# Patient Record
Sex: MALE | Race: BLACK OR AFRICAN AMERICAN | NOT HISPANIC OR LATINO | URBAN - METROPOLITAN AREA
[De-identification: names, ages, dates, MRNs, and addresses within clinical notes are randomized per-mention and may not be internally consistent; named-entity substitution may affect disease eponyms.]

---

## 2017-06-14 ENCOUNTER — INPATIENT (INPATIENT)
Facility: HOSPITAL | Age: 67
LOS: 0 days | Discharge: HOME | DRG: 670 | End: 2017-06-15
Attending: UROLOGY | Admitting: UROLOGY
Payer: COMMERCIAL

## 2017-06-14 VITALS
HEART RATE: 77 BPM | RESPIRATION RATE: 18 BRPM | OXYGEN SATURATION: 99 % | DIASTOLIC BLOOD PRESSURE: 63 MMHG | SYSTOLIC BLOOD PRESSURE: 140 MMHG | WEIGHT: 190.48 LBS | TEMPERATURE: 97 F | HEIGHT: 71 IN

## 2017-06-14 DIAGNOSIS — D49.4 NEOPLASM OF UNSPECIFIED BEHAVIOR OF BLADDER: Chronic | ICD-10-CM

## 2017-06-14 RX ORDER — SODIUM CHLORIDE 9 MG/ML
1000 INJECTION, SOLUTION INTRAVENOUS
Qty: 0 | Refills: 0 | Status: DISCONTINUED | OUTPATIENT
Start: 2017-06-14 | End: 2017-06-15

## 2017-06-14 RX ORDER — SENNA PLUS 8.6 MG/1
2 TABLET ORAL AT BEDTIME
Qty: 0 | Refills: 0 | Status: DISCONTINUED | OUTPATIENT
Start: 2017-06-14 | End: 2017-06-15

## 2017-06-14 RX ORDER — ONDANSETRON 8 MG/1
4 TABLET, FILM COATED ORAL EVERY 6 HOURS
Qty: 0 | Refills: 0 | Status: DISCONTINUED | OUTPATIENT
Start: 2017-06-14 | End: 2017-06-15

## 2017-06-14 RX ORDER — LISINOPRIL 2.5 MG/1
2.5 TABLET ORAL DAILY
Qty: 0 | Refills: 0 | Status: DISCONTINUED | OUTPATIENT
Start: 2017-06-14 | End: 2017-06-15

## 2017-06-14 RX ORDER — CEFAZOLIN SODIUM 1 G
1000 VIAL (EA) INJECTION EVERY 8 HOURS
Qty: 0 | Refills: 0 | Status: DISCONTINUED | OUTPATIENT
Start: 2017-06-14 | End: 2017-06-15

## 2017-06-14 RX ORDER — INSULIN LISPRO 100/ML
VIAL (ML) SUBCUTANEOUS
Qty: 0 | Refills: 0 | Status: DISCONTINUED | OUTPATIENT
Start: 2017-06-14 | End: 2017-06-15

## 2017-06-14 RX ORDER — DOCUSATE SODIUM 100 MG
100 CAPSULE ORAL
Qty: 0 | Refills: 0 | Status: DISCONTINUED | OUTPATIENT
Start: 2017-06-14 | End: 2017-06-15

## 2017-06-14 RX ORDER — ONDANSETRON 8 MG/1
4 TABLET, FILM COATED ORAL EVERY 6 HOURS
Qty: 0 | Refills: 0 | Status: DISCONTINUED | OUTPATIENT
Start: 2017-06-14 | End: 2017-06-14

## 2017-06-14 RX ORDER — MORPHINE SULFATE 50 MG/1
4 CAPSULE, EXTENDED RELEASE ORAL ONCE
Qty: 0 | Refills: 0 | Status: DISCONTINUED | OUTPATIENT
Start: 2017-06-14 | End: 2017-06-15

## 2017-06-14 RX ADMIN — Medication: at 10:50

## 2017-06-14 RX ADMIN — Medication 1: at 18:27

## 2017-06-14 RX ADMIN — Medication 100 MILLIGRAM(S): at 18:28

## 2017-06-14 RX ADMIN — Medication 100 MILLIGRAM(S): at 18:27

## 2017-06-14 NOTE — PROGRESS NOTE ADULT - SUBJECTIVE AND OBJECTIVE BOX
POST OP NOTE    Patient is a 66y old  Male who presents with a chief complaint of bladder neck contracture s/p cysto TURBN    Pt denies any pain, CP, SOB, n/v      Vital Signs Last 24 Hrs  T(C): 36.4, Max: 36.4 (06-14 @ 12:07)  T(F): 97.6, Max: 97.6 (06-14 @ 12:07)  HR: 90 (77 - 112)  BP: 150/68 (140/63 - 154/70)  BP(mean): --  RR: 16 (15 - 18)  SpO2: 99% (98% - 100%)    I&O's Summary    I & Os for current day (as of 14 Jun 2017 12:44)  =============================================  IN: 0 ml / OUT: 225 ml / NET: -225 ml      Gen: NAD    Abd: soft, NTTP    : mendoza intact and draining clear +CBI            cultures    A/P  66M s/p TURBN  -cont abx  -monitor UO  -pain meds PRN  -OOB/IS  -dvt ppx

## 2017-06-14 NOTE — ASU PATIENT PROFILE, ADULT - PMH
Bladder cancer    DM (diabetes mellitus)    HTN (hypertension)    Prostate disease    Urine retention

## 2017-06-15 VITALS
TEMPERATURE: 98 F | DIASTOLIC BLOOD PRESSURE: 52 MMHG | SYSTOLIC BLOOD PRESSURE: 153 MMHG | RESPIRATION RATE: 17 BRPM | HEART RATE: 93 BPM | OXYGEN SATURATION: 100 %

## 2017-06-15 LAB
HBA1C BLD-MCNC: 6.1 % — HIGH (ref 4–5.6)
SURGICAL PATHOLOGY STUDY: SIGNIFICANT CHANGE UP

## 2017-06-15 RX ORDER — LISINOPRIL 2.5 MG/1
10 TABLET ORAL DAILY
Qty: 0 | Refills: 0 | Status: DISCONTINUED | OUTPATIENT
Start: 2017-06-15 | End: 2017-06-15

## 2017-06-15 RX ADMIN — Medication 100 MILLIGRAM(S): at 02:15

## 2017-06-15 RX ADMIN — Medication 100 MILLIGRAM(S): at 06:11

## 2017-06-15 RX ADMIN — LISINOPRIL 10 MILLIGRAM(S): 2.5 TABLET ORAL at 09:58

## 2017-06-15 RX ADMIN — Medication 100 MILLIGRAM(S): at 09:58

## 2017-06-15 NOTE — DISCHARGE NOTE ADULT - MEDICATION SUMMARY - MEDICATIONS TO TAKE
I will START or STAY ON the medications listed below when I get home from the hospital:    lisinopril 10 mg oral tablet  -- 1 tab(s) by mouth once a day  -- Indication: For hypertension    metFORMIN 500 mg oral tablet  -- 1 tab(s) by mouth 2 times a day  -- Indication: For Diabetes Mellitis

## 2017-06-15 NOTE — PROGRESS NOTE ADULT - SUBJECTIVE AND OBJECTIVE BOX
AM NOTE    Patient is a 66y old  Male who presents with a chief complaint of bladder neck contracture s/p TURBN 6/14    No acute events o/n      Vital Signs Last 24 Hrs  T(C): 36.8, Max: 36.8 (06-14 @ 21:04)  T(F): 98.2, Max: 98.3 (06-14 @ 21:04)  HR: 81 (77 - 112)  BP: 119/48 (119/48 - 154/70)  BP(mean): --  RR: 18 (15 - 18)  SpO2: 98% (98% - 100%)    I&O's Summary    I & Os for current day (as of 15 Rodney 2017 05:42)  =============================================  IN: 0 ml / OUT: 3100 ml / NET: -3100 ml      Gen: NAD    Abd: soft, NTTP, ND    : FC intact and draining             cultures    A/P  66M s/p TURBN cysto 6/14  -monitor UO  -dvt ppx  -OOB/IS  -pain meds PRN  -d/c home today

## 2017-06-15 NOTE — DISCHARGE NOTE ADULT - PLAN OF CARE
ambulation and hydration. Diabetic Diet. No fluid restrictions.   Call MD for increase abdominal pain, nausea, vomiting, temperature >101.4F, or if mendoza catheter not draining well. Home with mendoza to leg bag. Care for mendoza/leg bag as instructed by nurses.  Hold any aspirin till ok with Dr. Amos. Resume home medications.

## 2017-06-15 NOTE — DISCHARGE NOTE ADULT - HOSPITAL COURSE
67 yo male s/p Cysto, TURBN 6/14/17. Tolerated procedure well. Uneventful post op course. To go home with mendoza to leg bag. Mendoza to be removed as outpt.

## 2017-06-15 NOTE — DISCHARGE NOTE ADULT - CARE PLAN
Principal Discharge DX:	Bladder neck contracture  Goal:	ambulation and hydration.  Instructions for follow-up, activity and diet:	Diabetic Diet. No fluid restrictions.   Call MD for increase abdominal pain, nausea, vomiting, temperature >101.4F, or if mendoza catheter not draining well. Home with mendoza to leg bag. Care for mendoza/leg bag as instructed by nurses.  Hold any aspirin till ok with Dr. Amos.  Secondary Diagnosis:	DM (diabetes mellitus)  Instructions for follow-up, activity and diet:	Resume home medications.  Secondary Diagnosis:	HTN (hypertension)  Instructions for follow-up, activity and diet:	Resume home medications.

## 2017-06-15 NOTE — DISCHARGE NOTE ADULT - CARE PROVIDER_API CALL
Malick Amos (MD), Urology  [address] [place] [address]  Phone: [phone]  Fax: [phone]

## 2017-06-15 NOTE — DISCHARGE NOTE ADULT - PATIENT PORTAL LINK FT
“You can access the [facility] Patient Portal, offered by [facility], by registering with the following website: [email or web address]”

## 2017-06-16 PROCEDURE — 36415 COLL VENOUS BLD VENIPUNCTURE: CPT

## 2017-06-16 PROCEDURE — 83036 HEMOGLOBIN GLYCOSYLATED A1C: CPT

## 2017-06-16 PROCEDURE — 88305 TISSUE EXAM BY PATHOLOGIST: CPT

## 2017-06-16 PROCEDURE — 87086 URINE CULTURE/COLONY COUNT: CPT

## 2017-06-17 LAB
CULTURE RESULTS: NO GROWTH — SIGNIFICANT CHANGE UP
SPECIMEN SOURCE: SIGNIFICANT CHANGE UP

## 2017-06-19 DIAGNOSIS — Z85.51 PERSONAL HISTORY OF MALIGNANT NEOPLASM OF BLADDER: ICD-10-CM

## 2017-06-19 DIAGNOSIS — I10 ESSENTIAL (PRIMARY) HYPERTENSION: ICD-10-CM

## 2017-06-19 DIAGNOSIS — Z90.79 ACQUIRED ABSENCE OF OTHER GENITAL ORGAN(S): ICD-10-CM

## 2017-06-19 DIAGNOSIS — N32.0 BLADDER-NECK OBSTRUCTION: ICD-10-CM

## 2017-06-19 DIAGNOSIS — E11.9 TYPE 2 DIABETES MELLITUS WITHOUT COMPLICATIONS: ICD-10-CM

## 2017-06-19 DIAGNOSIS — Z85.46 PERSONAL HISTORY OF MALIGNANT NEOPLASM OF PROSTATE: ICD-10-CM

## 2017-06-19 DIAGNOSIS — N40.0 BENIGN PROSTATIC HYPERPLASIA WITHOUT LOWER URINARY TRACT SYMPTOMS: ICD-10-CM

## 2018-11-27 ENCOUNTER — OUTPATIENT (OUTPATIENT)
Dept: OUTPATIENT SERVICES | Facility: HOSPITAL | Age: 68
LOS: 1 days | End: 2018-11-27
Payer: COMMERCIAL

## 2018-11-27 DIAGNOSIS — C61 MALIGNANT NEOPLASM OF PROSTATE: ICD-10-CM

## 2018-11-27 DIAGNOSIS — D49.4 NEOPLASM OF UNSPECIFIED BEHAVIOR OF BLADDER: Chronic | ICD-10-CM

## 2018-11-27 LAB — SURGICAL PATHOLOGY STUDY: SIGNIFICANT CHANGE UP

## 2018-11-27 PROCEDURE — 88321 CONSLTJ&REPRT SLD PREP ELSWR: CPT

## 2018-11-30 ENCOUNTER — OUTPATIENT (OUTPATIENT)
Dept: OUTPATIENT SERVICES | Facility: HOSPITAL | Age: 68
LOS: 1 days | End: 2018-11-30
Payer: COMMERCIAL

## 2018-11-30 DIAGNOSIS — D49.4 NEOPLASM OF UNSPECIFIED BEHAVIOR OF BLADDER: Chronic | ICD-10-CM

## 2018-11-30 DIAGNOSIS — C61 MALIGNANT NEOPLASM OF PROSTATE: ICD-10-CM

## 2018-11-30 PROCEDURE — 88321 CONSLTJ&REPRT SLD PREP ELSWR: CPT

## 2018-12-03 LAB — SURGICAL PATHOLOGY STUDY: SIGNIFICANT CHANGE UP

## 2019-04-03 ENCOUNTER — OUTPATIENT (OUTPATIENT)
Dept: OUTPATIENT SERVICES | Facility: HOSPITAL | Age: 69
LOS: 1 days | Discharge: HOME | End: 2019-04-03

## 2019-04-03 DIAGNOSIS — D49.4 NEOPLASM OF UNSPECIFIED BEHAVIOR OF BLADDER: Chronic | ICD-10-CM

## 2019-04-03 LAB — GLUCOSE BLDC GLUCOMTR-MCNC: 135 MG/DL — HIGH (ref 70–99)

## 2019-04-04 LAB — SURGICAL PATHOLOGY STUDY: SIGNIFICANT CHANGE UP

## 2019-04-08 LAB — NON-GYNECOLOGICAL CYTOLOGY STUDY: SIGNIFICANT CHANGE UP

## 2022-09-19 NOTE — DISCHARGE NOTE ADULT - MEDICATION SUMMARY - MEDICATIONS TO CHANGE
I will SWITCH the dose or number of times a day I take the medications listed below when I get home from the hospital:  None
Report given to COLEEN Palmer.